# Patient Record
Sex: FEMALE | Race: WHITE | NOT HISPANIC OR LATINO | Employment: OTHER | URBAN - METROPOLITAN AREA
[De-identification: names, ages, dates, MRNs, and addresses within clinical notes are randomized per-mention and may not be internally consistent; named-entity substitution may affect disease eponyms.]

---

## 2018-08-20 DIAGNOSIS — M47.896 OTHER SPONDYLOSIS, LUMBAR REGION: ICD-10-CM

## 2018-08-20 DIAGNOSIS — M47.22 CERVICAL SPONDYLOSIS WITH RADICULOPATHY: Primary | ICD-10-CM

## 2018-09-25 ENCOUNTER — OFFICE VISIT (OUTPATIENT)
Dept: URGENT CARE | Facility: CLINIC | Age: 58
End: 2018-09-25
Payer: COMMERCIAL

## 2018-09-25 VITALS
HEIGHT: 61 IN | WEIGHT: 148 LBS | HEART RATE: 108 BPM | TEMPERATURE: 97 F | SYSTOLIC BLOOD PRESSURE: 140 MMHG | OXYGEN SATURATION: 98 % | BODY MASS INDEX: 27.94 KG/M2 | DIASTOLIC BLOOD PRESSURE: 82 MMHG

## 2018-09-25 DIAGNOSIS — F13.939 WITHDRAWAL FROM SEDATIVE, HYPNOTIC, OR ANXIOLYTIC DRUG: ICD-10-CM

## 2018-09-25 DIAGNOSIS — R00.0 TACHYCARDIA: ICD-10-CM

## 2018-09-25 DIAGNOSIS — F41.1 ANXIETY REACTION: Primary | ICD-10-CM

## 2018-09-25 PROCEDURE — 99203 OFFICE O/P NEW LOW 30 MIN: CPT | Mod: S$GLB,,, | Performed by: NURSE PRACTITIONER

## 2018-09-25 PROCEDURE — 3008F BODY MASS INDEX DOCD: CPT | Mod: CPTII,S$GLB,, | Performed by: NURSE PRACTITIONER

## 2018-09-25 RX ORDER — HYDROXYZINE HYDROCHLORIDE 25 MG/1
TABLET, FILM COATED ORAL
Refills: 0 | COMMUNITY
Start: 2018-09-18

## 2018-09-25 RX ORDER — HYDROCODONE BITARTRATE AND ACETAMINOPHEN 10; 325 MG/1; MG/1
TABLET ORAL
Refills: 0 | COMMUNITY
Start: 2018-08-29

## 2018-09-25 RX ORDER — BACLOFEN 10 MG/1
TABLET ORAL
Refills: 0 | COMMUNITY
Start: 2018-09-12

## 2018-09-25 RX ORDER — METFORMIN HYDROCHLORIDE 500 MG/1
TABLET, EXTENDED RELEASE ORAL
Refills: 4 | COMMUNITY
Start: 2018-09-04

## 2018-09-25 NOTE — PROGRESS NOTES
"Subjective:       Patient ID: Andreina Brannon is a 58 y.o. female.    Vitals:  height is 5' 1" (1.549 m) and weight is 67.1 kg (148 lb). Her temperature is 97.2 °F (36.2 °C). Her blood pressure is 140/82 (abnormal) and her pulse is 108. Her oxygen saturation is 98%.     Chief Complaint: Chest Pain    Patient originally checked in with c/o elevated heart rate.  She is not having chest pain.  Patient reports "they're screwing with my medicines."  She is under the care of her PCP through [facility] and [facility].  She was stopped on Xanax, Oxycontin, and Soma over the last month and states that she feels like her anxiety is worsening with insomnia and feeling her heart race.  She went to her PCP 1 week ago for this same complaint and was started on Vistaril four times a day.  She states that she's getting no relief.  She also reports that she is still taking her Cymbalta with no recent changes.  She states that no one in the office will write her for anxiety medication.  She was told to follow up with psychiatry but she has not found a psychiatrist.  She actually can not feel her heart racing states that she just feels "anxious."  Reports that she knows of her elevated heart rate s/t her PCP noting it to be elevated during exam last week.  She does not have a history of HTN, CAD, MI, Stroke.  No history of early MI in her family.  She denies associated shortness of breath, leg swelling, diaphoresis, and again denies chest pain.      Anxiety   Presents for initial visit. The problem has been gradually worsening. Symptoms include dry mouth, excessive worry, insomnia, irritability, nervous/anxious behavior, palpitations and restlessness. Patient reports no chest pain, compulsions, confusion, decreased concentration, depressed mood, dizziness, feeling of choking, hyperventilation, impotence, malaise, muscle tension, nausea, obsessions, panic, shortness of breath or suicidal ideas.     Her past medical history is " significant for anxiety/panic attacks. There is no history of arrhythmia, bipolar disorder, CAD, CHF, hyperthyroidism or suicide attempts. Past treatments include benzodiazephines and SSRIs.     Review of Systems   Constitution: Positive for irritability. Negative for chills, fever and malaise/fatigue.   HENT: Negative for hoarse voice.    Eyes: Negative for blurred vision.   Cardiovascular: Positive for palpitations. Negative for chest pain, irregular heartbeat, leg swelling, near-syncope and syncope.   Respiratory: Negative for cough and shortness of breath.    Skin: Negative for rash.   Musculoskeletal: Negative for back pain.   Gastrointestinal: Negative for abdominal pain, nausea and vomiting.   Genitourinary: Negative for impotence.   Neurological: Negative for dizziness, headaches and light-headedness.   Psychiatric/Behavioral: Negative for confusion, decreased concentration and suicidal ideas. The patient has insomnia and is nervous/anxious.        Objective:      Physical Exam   Constitutional: She is oriented to person, place, and time. She appears well-developed and well-nourished. She is cooperative.  Non-toxic appearance. She does not appear ill. No distress.   HENT:   Head: Normocephalic and atraumatic.   Right Ear: Hearing, tympanic membrane, external ear and ear canal normal.   Left Ear: Hearing, tympanic membrane, external ear and ear canal normal.   Nose: Nose normal. No mucosal edema, rhinorrhea or nasal deformity. No epistaxis. Right sinus exhibits no maxillary sinus tenderness and no frontal sinus tenderness. Left sinus exhibits no maxillary sinus tenderness and no frontal sinus tenderness.   Mouth/Throat: Uvula is midline, oropharynx is clear and moist and mucous membranes are normal. No trismus in the jaw. Normal dentition. No uvula swelling. No posterior oropharyngeal erythema.   Eyes: Conjunctivae and lids are normal. Right eye exhibits no discharge. Left eye exhibits no discharge. No  scleral icterus.   Sclera clear bilat   Neck: Trachea normal, normal range of motion, full passive range of motion without pain and phonation normal. Neck supple.   Cardiovascular: Normal rate, regular rhythm, normal heart sounds, intact distal pulses and normal pulses.   Pulmonary/Chest: Effort normal and breath sounds normal. No respiratory distress.   Abdominal: Soft. Normal appearance and bowel sounds are normal. She exhibits no distension, no pulsatile midline mass and no mass. There is no tenderness.   Musculoskeletal: Normal range of motion. She exhibits no edema or deformity.   Neurological: She is alert and oriented to person, place, and time. She exhibits normal muscle tone. Coordination normal.   Skin: Skin is warm, dry and intact. She is not diaphoretic. No pallor.   Psychiatric: She has a normal mood and affect. Her speech is normal and behavior is normal. Judgment and thought content normal. Cognition and memory are normal.   Dry mouth, slight tremor to hands, rapid speech, normal thought process, denies SI.  Does appear anxious.   Nursing note and vitals reviewed.      EKG: normal EKG, normal sinus rhythm with sinus tachycardia, HR of 108, no acute ST findings, no EKG for comparison.  Assessment:       1. Anxiety reaction    2. Withdrawal from sedative, hypnotic, or anxiolytic drug    3. Tachycardia        Plan:         Anxiety reaction  -     Ambulatory referral to Psychiatry    Withdrawal from sedative, hypnotic, or anxiolytic drug  -     Ambulatory referral to Psychiatry    Tachycardia  -     Ambulatory referral to Psychiatry  -     IN OFFICE EKG 12-LEAD (to [facility])      Patient Instructions   A referral was placed for psychiatry for further management of your anxiety medications.  Call your PCP to schedule follow up as well.  Go to the ER for any worsening symptoms including chest pain, shortness of breath, feeling like you're going to pass out, worsening anxiety, seizures, or thoughts of wanting to  harm yourself or others.  Anxiety Reaction  Anxiety is the feeling we all get when we think something bad might happen. It is a normal response to stress and usually causes only a mild reaction. When anxiety becomes more severe, it can interfere with daily life. In some cases, you may not even be aware of what it is youre anxious about. There may also be a genetic link or it may be a learned behavior in the home.  Both psychological and physical triggers cause stress reaction. It's often a response to fear or emotional stress, real or imagined. This stress may come from home, family, work, or social relationships.  During an anxiety reaction, you may feel:  · Helpless  · Nervous  · Depressed  · Irritable  Your body may show signs of anxiety in many ways. You may experience:  · Dry mouth  · Shakiness  · Dizziness  · Weakness  · Trouble breathing  · Breathing fast (hyperventilating)  · Chest pressure  · Sweating  · Headache  · Nausea  · Diarrhea  · Tiredness  · Inability to sleep  · Sexual problems  Home care  · Try to locate the sources of stress in your life. They may not be obvious. These may include:  ¨ Daily hassles of life (traffic jams, missed appointments, car troubles, etc.)  ¨ Major life changes, both good (new baby, job promotion) and bad (loss of job, loss of loved one)  ¨ Overload: feeling that you have too many responsibilities and can't take care of all of them at once  ¨ Feeling helpless, feeling that your problems are beyond what youre able to solve  · Notice how your body reacts to stress. Learn to listen to your body signals. This will help you take action before the stress becomes severe.  · When you can, do something about the source of your stress. (Avoid hassles, limit the amount of change that happens in your life at one time and take a break when you feel overloaded).  · Unfortunately, many stressful situations can't be avoided. It is necessary to learn how to better manage stress. There are  many proven methods that will reduce your anxiety. These include simple things like exercise, good nutrition and adequate rest. Also, there are certain techniques that are helpful:  ¨ Relaxation  ¨ Breathing exercises  ¨ Visualization  ¨ Biofeedback  ¨ Meditation  For more information about this, consult your doctor or go to a local bookstore and review the many books and tapes available on this subject.  Follow-up care  If you feel that your anxiety is not responding to self-help measures, contact your doctor or make an appointment with a counselor. You may need short-term psychological counseling and temporary medicine to help you manage stress.  Call 911  Call your healthcare provider right away if any of these occur:  · Trouble breathing  · Confusion  · Drowsiness or trouble wakening  · Fainting or loss of consciousness  · Rapid heart rate  · Seizure  · New chest pain that becomes more severe, lasts longer, or spreads into your shoulder, arm, neck, jaw, or back  When to seek medical advice  Call your healthcare provider right away if any of these occur:  · Your symptoms get worse  · Severe headache not relieved by rest and mild pain reliever  Date Last Reviewed: 9/29/2015  © 9408-3597 [employer] [address] [place] [address], [place] [address]. All rights reserved. This information is not intended as a substitute for professional medical care. Always follow your healthcare professional's instructions.

## 2018-09-25 NOTE — PATIENT INSTRUCTIONS
A referral was placed for psychiatry for further management of your anxiety medications.  Call your PCP to schedule follow up as well.  Go to the ER for any worsening symptoms including chest pain, shortness of breath, feeling like you're going to pass out, worsening anxiety, seizures, or thoughts of wanting to harm yourself or others.  Anxiety Reaction  Anxiety is the feeling we all get when we think something bad might happen. It is a normal response to stress and usually causes only a mild reaction. When anxiety becomes more severe, it can interfere with daily life. In some cases, you may not even be aware of what it is youre anxious about. There may also be a genetic link or it may be a learned behavior in the home.  Both psychological and physical triggers cause stress reaction. It's often a response to fear or emotional stress, real or imagined. This stress may come from home, family, work, or social relationships.  During an anxiety reaction, you may feel:  · Helpless  · Nervous  · Depressed  · Irritable  Your body may show signs of anxiety in many ways. You may experience:  · Dry mouth  · Shakiness  · Dizziness  · Weakness  · Trouble breathing  · Breathing fast (hyperventilating)  · Chest pressure  · Sweating  · Headache  · Nausea  · Diarrhea  · Tiredness  · Inability to sleep  · Sexual problems  Home care  · Try to locate the sources of stress in your life. They may not be obvious. These may include:  ¨ Daily hassles of life (traffic jams, missed appointments, car troubles, etc.)  ¨ Major life changes, both good (new baby, job promotion) and bad (loss of job, loss of loved one)  ¨ Overload: feeling that you have too many responsibilities and can't take care of all of them at once  ¨ Feeling helpless, feeling that your problems are beyond what youre able to solve  · Notice how your body reacts to stress. Learn to listen to your body signals. This will help you take action before the stress becomes  severe.  · When you can, do something about the source of your stress. (Avoid hassles, limit the amount of change that happens in your life at one time and take a break when you feel overloaded).  · Unfortunately, many stressful situations can't be avoided. It is necessary to learn how to better manage stress. There are many proven methods that will reduce your anxiety. These include simple things like exercise, good nutrition and adequate rest. Also, there are certain techniques that are helpful:  ¨ Relaxation  ¨ Breathing exercises  ¨ Visualization  ¨ Biofeedback  ¨ Meditation  For more information about this, consult your doctor or go to a local bookstore and review the many books and tapes available on this subject.  Follow-up care  If you feel that your anxiety is not responding to self-help measures, contact your doctor or make an appointment with a counselor. You may need short-term psychological counseling and temporary medicine to help you manage stress.  Call 911  Call your healthcare provider right away if any of these occur:  · Trouble breathing  · Confusion  · Drowsiness or trouble wakening  · Fainting or loss of consciousness  · Rapid heart rate  · Seizure  · New chest pain that becomes more severe, lasts longer, or spreads into your shoulder, arm, neck, jaw, or back  When to seek medical advice  Call your healthcare provider right away if any of these occur:  · Your symptoms get worse  · Severe headache not relieved by rest and mild pain reliever  Date Last Reviewed: 9/29/2015  © 2794-5825 [employer]. [address] [place] [address], [place] [address]. All rights reserved. This information is not intended as a substitute for professional medical care. Always follow your healthcare professional's instructions.

## 2018-11-25 ENCOUNTER — HOSPITAL ENCOUNTER (EMERGENCY)
Facility: HOSPITAL | Age: 58
Discharge: HOME | End: 2018-11-25
Attending: INTERNAL MEDICINE
Payer: COMMERCIAL

## 2018-11-25 VITALS
TEMPERATURE: 98 F | SYSTOLIC BLOOD PRESSURE: 168 MMHG | BODY MASS INDEX: 29.45 KG/M2 | WEIGHT: 156 LBS | OXYGEN SATURATION: 96 % | RESPIRATION RATE: 18 BRPM | DIASTOLIC BLOOD PRESSURE: 87 MMHG | HEART RATE: 90 BPM | HEIGHT: 61 IN

## 2018-11-25 DIAGNOSIS — M79.602 LEFT ARM PAIN: Primary | ICD-10-CM

## 2018-11-25 PROCEDURE — 63600175 PHARM REV CODE 636 W HCPCS: Performed by: INTERNAL MEDICINE

## 2018-11-25 PROCEDURE — 96372 THER/PROPH/DIAG INJ SC/IM: CPT

## 2018-11-25 PROCEDURE — 99284 EMERGENCY DEPT VISIT MOD MDM: CPT | Mod: 25

## 2018-11-25 RX ORDER — KETOROLAC TROMETHAMINE 30 MG/ML
60 INJECTION, SOLUTION INTRAMUSCULAR; INTRAVENOUS
Status: COMPLETED | OUTPATIENT
Start: 2018-11-25 | End: 2018-11-25

## 2018-11-25 RX ADMIN — KETOROLAC TROMETHAMINE 60 MG: 30 INJECTION, SOLUTION INTRAMUSCULAR at 01:11

## 2018-11-25 NOTE — ED NOTES
Pt arrived to ED with c/o pain that starts at left elbow and radiates down to fingers. Pt is currently being treated for this pain for the past month. Pt reports pain has increased and is worse tonight. Resp even and non labored. NAD noted.

## 2018-11-25 NOTE — ED PROVIDER NOTES
Encounter Date: 11/25/2018       History     Chief Complaint   Patient presents with    left arm pain     pt c/o left arm pain starting at elbow down to fingers, hx of chronic back pain and nerve pain x3 months. pain worsened tonight, reports norco 10 not helping, denies new injury or trauma, pulse present, nad     58-year-old female presents to the emergency department complaining of left arm and hand pain times several weeks.  She states she has pinched nerve in her neck and in her lower back for which she sees pain clinic and received injections.  She called her pain clinic doctors office tonight and they advised her to come to the emergency department.  Patient denies fever/chills, nausea/vomiting.  She denies any focal neurologic symptoms as well.      The history is provided by the patient. No  was used.     Review of patient's allergies indicates:   Allergen Reactions    Morphine     Reglan [metoclopramide hcl]     Sulfa (sulfonamide antibiotics)     Iodine and iodide containing products Rash     Past Medical History:   Diagnosis Date    Anxiety     Chronic back pain     Hypertension      Past Surgical History:   Procedure Laterality Date    HYSTERECTOMY       Family History   Problem Relation Age of Onset    No Known Problems Mother     No Known Problems Father      Social History     Tobacco Use    Smoking status: Never Smoker    Smokeless tobacco: Never Used   Substance Use Topics    Alcohol use: No     Frequency: Never    Drug use: No     Review of Systems   Musculoskeletal:        Left arm pain   All other systems reviewed and are negative.      Physical Exam     Initial Vitals [11/25/18 0052]   BP Pulse Resp Temp SpO2   (!) 176/81 90 18 98.4 °F (36.9 °C) 98 %      MAP       --         Physical Exam    Nursing note and vitals reviewed.  Constitutional: She appears well-developed and well-nourished. No distress.   HENT:   Head: Normocephalic and atraumatic.   Right Ear:  External ear normal.   Left Ear: External ear normal.   Eyes: Conjunctivae and EOM are normal.   Neck: Normal range of motion. Neck supple.   Cardiovascular: Normal rate and regular rhythm.   Pulmonary/Chest: Breath sounds normal. No respiratory distress.   Abdominal: Soft. Bowel sounds are normal.   Musculoskeletal:   Right arm pain upon movement without gross deformity.  Neurovascularly intact.   Neurological: She is alert and oriented to person, place, and time. She has normal strength and normal reflexes. No cranial nerve deficit or sensory deficit.   Skin: Skin is warm and dry.   Psychiatric: She has a normal mood and affect.         ED Course   Procedures  Labs Reviewed - No data to display       Imaging Results    None          Medical Decision Making:   Initial Assessment:   58-year-old female presents to the emergency department complaining of left arm and hand pain times several weeks.  She states she has pinched nerve in her neck and in her lower back for which she sees pain clinic and received injections.  She called her pain clinic doctors office tonight and they advised her to come to the emergency department.  Patient denies fever/chills, nausea/vomiting.  She denies any focal neurologic symptoms as well.    ED Management:  Patient was counseled on chronic pain and the need for her chronic pain to be treated in her Pain Clinic instead the emergency department.  She was not found to have any emergent cause for her left upper extremity pain tonight and was given explanation as to why her a narcotic pain medications could not be refilled in the emergency department.  Patient was given a dose of Toradol in the emergency department and advised to follow up with her pain clinic doctor within the next 2 days for re-evaluation and refills for pain medications.                      Clinical Impression:   The encounter diagnosis was Left arm pain.      Disposition:   Disposition: Discharged  Condition:  Stable                        Regan Dickson MD  11/25/18 0106       Regan Dickson MD  11/25/18 0106

## 2019-06-28 ENCOUNTER — HOSPITAL ENCOUNTER (EMERGENCY)
Facility: HOSPITAL | Age: 59
Discharge: HOME | End: 2019-06-28
Attending: EMERGENCY MEDICINE
Payer: COMMERCIAL

## 2019-06-28 VITALS
DIASTOLIC BLOOD PRESSURE: 76 MMHG | TEMPERATURE: 98 F | SYSTOLIC BLOOD PRESSURE: 131 MMHG | RESPIRATION RATE: 16 BRPM | BODY MASS INDEX: 27 KG/M2 | HEIGHT: 61 IN | HEART RATE: 86 BPM | OXYGEN SATURATION: 97 % | WEIGHT: 143 LBS

## 2019-06-28 DIAGNOSIS — R10.13 EPIGASTRIC PAIN: Primary | ICD-10-CM

## 2019-06-28 LAB
ALBUMIN SERPL BCP-MCNC: 4.1 G/DL (ref 3.5–5.2)
ALP SERPL-CCNC: 60 U/L (ref 55–135)
ALT SERPL W/O P-5'-P-CCNC: 9 U/L (ref 10–44)
ANION GAP SERPL CALC-SCNC: 8 MMOL/L (ref 8–16)
AST SERPL-CCNC: 22 U/L (ref 10–40)
BASOPHILS # BLD AUTO: 0.02 K/UL (ref 0–0.2)
BASOPHILS NFR BLD: 0.5 % (ref 0–1.9)
BILIRUB SERPL-MCNC: 0.9 MG/DL (ref 0.1–1)
BNP SERPL-MCNC: 26 PG/ML (ref 0–99)
BUN SERPL-MCNC: 12 MG/DL (ref 6–20)
CALCIUM SERPL-MCNC: 10 MG/DL (ref 8.7–10.5)
CHLORIDE SERPL-SCNC: 107 MMOL/L (ref 95–110)
CO2 SERPL-SCNC: 27 MMOL/L (ref 23–29)
CREAT SERPL-MCNC: 0.7 MG/DL (ref 0.5–1.4)
DIFFERENTIAL METHOD: ABNORMAL
EOSINOPHIL # BLD AUTO: 0 K/UL (ref 0–0.5)
EOSINOPHIL NFR BLD: 1 % (ref 0–8)
ERYTHROCYTE [DISTWIDTH] IN BLOOD BY AUTOMATED COUNT: 13.4 % (ref 11.5–14.5)
EST. GFR  (AFRICAN AMERICAN): >60 ML/MIN/1.73 M^2
EST. GFR  (NON AFRICAN AMERICAN): >60 ML/MIN/1.73 M^2
GLUCOSE SERPL-MCNC: 134 MG/DL (ref 70–110)
HCT VFR BLD AUTO: 37.8 % (ref 37–48.5)
HGB BLD-MCNC: 11.9 G/DL (ref 12–16)
LIPASE SERPL-CCNC: 13 U/L (ref 4–60)
LYMPHOCYTES # BLD AUTO: 1.5 K/UL (ref 1–4.8)
LYMPHOCYTES NFR BLD: 36.9 % (ref 18–48)
MCH RBC QN AUTO: 27 PG (ref 27–31)
MCHC RBC AUTO-ENTMCNC: 31.5 G/DL (ref 32–36)
MCV RBC AUTO: 86 FL (ref 82–98)
MONOCYTES # BLD AUTO: 0.2 K/UL (ref 0.3–1)
MONOCYTES NFR BLD: 6.1 % (ref 4–15)
NEUTROPHILS # BLD AUTO: 2.2 K/UL (ref 1.8–7.7)
NEUTROPHILS NFR BLD: 55.5 % (ref 38–73)
PLATELET # BLD AUTO: 382 K/UL (ref 150–350)
PMV BLD AUTO: 9.1 FL (ref 9.2–12.9)
POTASSIUM SERPL-SCNC: 3.4 MMOL/L (ref 3.5–5.1)
PROT SERPL-MCNC: 7.4 G/DL (ref 6–8.4)
RBC # BLD AUTO: 4.4 M/UL (ref 4–5.4)
SODIUM SERPL-SCNC: 142 MMOL/L (ref 136–145)
TROPONIN I SERPL DL<=0.01 NG/ML-MCNC: <0.006 NG/ML (ref 0–0.03)
WBC # BLD AUTO: 3.93 K/UL (ref 3.9–12.7)

## 2019-06-28 PROCEDURE — 85025 COMPLETE CBC W/AUTO DIFF WBC: CPT

## 2019-06-28 PROCEDURE — 93005 ELECTROCARDIOGRAM TRACING: CPT

## 2019-06-28 PROCEDURE — 99285 EMERGENCY DEPT VISIT HI MDM: CPT | Mod: 25

## 2019-06-28 PROCEDURE — S0028 INJECTION, FAMOTIDINE, 20 MG: HCPCS | Performed by: EMERGENCY MEDICINE

## 2019-06-28 PROCEDURE — 83690 ASSAY OF LIPASE: CPT

## 2019-06-28 PROCEDURE — 25000003 PHARM REV CODE 250: Performed by: EMERGENCY MEDICINE

## 2019-06-28 PROCEDURE — 80053 COMPREHEN METABOLIC PANEL: CPT

## 2019-06-28 PROCEDURE — 93010 EKG 12-LEAD: ICD-10-PCS | Mod: ,,, | Performed by: INTERNAL MEDICINE

## 2019-06-28 PROCEDURE — 93010 ELECTROCARDIOGRAM REPORT: CPT | Mod: ,,, | Performed by: INTERNAL MEDICINE

## 2019-06-28 PROCEDURE — 84484 ASSAY OF TROPONIN QUANT: CPT

## 2019-06-28 PROCEDURE — 83880 ASSAY OF NATRIURETIC PEPTIDE: CPT

## 2019-06-28 PROCEDURE — 96374 THER/PROPH/DIAG INJ IV PUSH: CPT

## 2019-06-28 RX ORDER — FAMOTIDINE 10 MG/ML
40 INJECTION INTRAVENOUS
Status: COMPLETED | OUTPATIENT
Start: 2019-06-28 | End: 2019-06-28

## 2019-06-28 RX ORDER — SIMETHICONE 125 MG
125 CAPSULE ORAL 4 TIMES DAILY PRN
Qty: 30 CAPSULE | Refills: 0 | Status: SHIPPED | OUTPATIENT
Start: 2019-06-28

## 2019-06-28 RX ORDER — ONDANSETRON 4 MG/1
4 TABLET, FILM COATED ORAL EVERY 6 HOURS
Qty: 12 TABLET | Refills: 0 | OUTPATIENT
Start: 2019-06-28 | End: 2020-02-15

## 2019-06-28 RX ORDER — DICYCLOMINE HYDROCHLORIDE 20 MG/1
20 TABLET ORAL 2 TIMES DAILY
Qty: 20 TABLET | Refills: 0 | Status: SHIPPED | OUTPATIENT
Start: 2019-06-28 | End: 2019-07-28

## 2019-06-28 RX ORDER — SUCRALFATE 1 G/1
1 TABLET ORAL 4 TIMES DAILY
Qty: 60 TABLET | Refills: 0 | Status: SHIPPED | OUTPATIENT
Start: 2019-06-28

## 2019-06-28 RX ADMIN — LIDOCAINE HYDROCHLORIDE: 20 SOLUTION ORAL; TOPICAL at 01:06

## 2019-06-28 RX ADMIN — FAMOTIDINE 40 MG: 10 INJECTION INTRAVENOUS at 01:06

## 2019-06-28 NOTE — ED TRIAGE NOTES
Pt reports to the ED with c/o chest pain that began around 1050AM this morning. States the pain radiates to the left side of her back and is located to the midsternal area of the chest. Also c/o SOB, dizziness, and nausea. VSS. In NAD at this time.

## 2019-06-28 NOTE — DISCHARGE INSTRUCTIONS
Thank you for coming to our Emergency Department today. It is important to remember that some problems are difficult to diagnose and may not be found during your first visit. Be sure to follow up with your primary care doctor and review any labs/imaging that was performed with them. If you do not have a primary care doctor, you may contact the one listed on your discharge paperwork or you may also call the [facility] Appointment Desk at [phone] to schedule an appointment with one.     All medications may potentially have side effects and it is impossible to predict which medications may give you side effects. If you feel that you are having a negative effect of any medication you should immediately stop taking them and seek medical attention.    Return to the ER with any questions/concerns, new/concerning symptoms, worsening or failure to improve. Do not drive or make any important decisions for 24 hours if you have received any pain medications, sedatives or mood altering drugs during your ER visit.

## 2019-06-28 NOTE — ED PROVIDER NOTES
"Encounter Date: 6/28/2019  This is a SORT/MSE of a 59 y.o. female with HTN presenting to the ED with c/o mid-sternal CP since 10:30 am. Care will be transferred to an alternate provider when patient is roomed for a full evaluation and final disposition. Patient is aware that he/she is awaiting a room in the emergency department, where another provider will review results, evaluate and treat as needed. MATTHIAS Plummer DNP     History     Chief Complaint   Patient presents with    Chest Pain     reports having CP that started around 1050 this morning.  staes CP is to left mid back are,a nd mid chest area. staes having SOB,  dizziness, and nausea     59 y.o.  female Past Medical History:  No date: Anxiety  No date: Chronic back pain  No date: Hypertension     Presents c/o sharp epigastric pain radiating to back starting approx 10:50 this am. Notes associated nausea with it, pain came on while eating milk/cookies. Denies chest pressure. Did note that the pain got "so bad that she thought she was going to pass out and felt dizzy". Notes that she feels that it is slightly harder to breath. Denies etoh, has never had gallstones.        Review of patient's allergies indicates:   Allergen Reactions    Morphine     Reglan [metoclopramide hcl]     Sulfa (sulfonamide antibiotics)     Iodine and iodide containing products Rash     Past Medical History:   Diagnosis Date    Anxiety     Chronic back pain     Hypertension      Past Surgical History:   Procedure Laterality Date    HYSTERECTOMY       Family History   Problem Relation Age of Onset    No Known Problems Mother     No Known Problems Father      Social History     Tobacco Use    Smoking status: Never Smoker    Smokeless tobacco: Never Used   Substance Use Topics    Alcohol use: No     Frequency: Never    Drug use: No     Review of Systems   Constitutional: Negative for fever.   HENT: Negative for sore throat.    Respiratory: Negative for shortness of breath.  "   Cardiovascular: Negative for chest pain.   Gastrointestinal: Positive for abdominal pain. Negative for nausea.   Genitourinary: Negative for dysuria.   Musculoskeletal: Negative for back pain.   Skin: Negative for rash.   Neurological: Negative for weakness.   Hematological: Does not bruise/bleed easily.   All other systems reviewed and are negative.      Physical Exam     Initial Vitals [06/28/19 1225]   BP Pulse Resp Temp SpO2   132/68 97 18 98 °F (36.7 °C) 97 %      MAP       --         Physical Exam    Nursing note and vitals reviewed.  Constitutional: She appears well-developed and well-nourished.   HENT:   Head: Normocephalic and atraumatic.   Eyes: Conjunctivae and EOM are normal. Pupils are equal, round, and reactive to light.   Neck: Normal range of motion.   Cardiovascular: Normal rate, regular rhythm and normal heart sounds.   Pulmonary/Chest: Breath sounds normal. No respiratory distress. She has no wheezes. She has no rales.   Abdominal: She exhibits no distension.   Musculoskeletal: Normal range of motion.   Neurological: She is alert. No cranial nerve deficit. GCS score is 15. GCS eye subscore is 4. GCS verbal subscore is 5. GCS motor subscore is 6.   Skin: Skin is warm and dry.   Psychiatric: She has a normal mood and affect. Thought content normal.     completely reproducible epigastric ttp, pushing on this area makes patient wince.    ED Course   Procedures  Labs Reviewed   CBC W/ AUTO DIFFERENTIAL   COMPREHENSIVE METABOLIC PANEL   TROPONIN I   B-TYPE NATRIURETIC PEPTIDE     EKG Readings: (Independently Interpreted)   Hr 98, nl axis/intervals, no nando, twi III, AVF, no stemi.       Imaging Results    None             Additional MDM:   Heart Score:    History:          Slightly suspicious.  ECG:             Nonspecific repolarisation disturbance  Age:               45-65 years  Risk factors: 1-2 risk factors  Troponin:       Less than or equal to normal limit  Final Score: 3                  epigastric pain is non cardiac in nature, reproducible with palpation. Will start pt on carafate, simethicone, bentyl, zofran and have her f/u with pmd.     Clinical Impression:       ICD-10-CM ICD-9-CM   1. Epigastric pain R10.13 789.06                                Varinder Meza MD  06/28/19 1345

## 2020-02-14 ENCOUNTER — PATIENT MESSAGE (OUTPATIENT)
Dept: URGENT CARE | Facility: CLINIC | Age: 60
End: 2020-02-14

## 2020-02-14 ENCOUNTER — OFFICE VISIT (OUTPATIENT)
Dept: URGENT CARE | Facility: CLINIC | Age: 60
End: 2020-02-14
Payer: COMMERCIAL

## 2020-02-14 VITALS
HEART RATE: 64 BPM | OXYGEN SATURATION: 98 % | DIASTOLIC BLOOD PRESSURE: 78 MMHG | HEIGHT: 61 IN | TEMPERATURE: 98 F | WEIGHT: 143 LBS | SYSTOLIC BLOOD PRESSURE: 130 MMHG | BODY MASS INDEX: 27 KG/M2

## 2020-02-14 DIAGNOSIS — R11.0 NAUSEA: ICD-10-CM

## 2020-02-14 DIAGNOSIS — H65.93 MIDDLE EAR EFFUSION, BILATERAL: ICD-10-CM

## 2020-02-14 DIAGNOSIS — H81.10 BENIGN PAROXYSMAL POSITIONAL VERTIGO, UNSPECIFIED LATERALITY: Primary | ICD-10-CM

## 2020-02-14 PROCEDURE — 99214 PR OFFICE/OUTPT VISIT, EST, LEVL IV, 30-39 MIN: ICD-10-PCS | Mod: S$GLB,,, | Performed by: PHYSICIAN ASSISTANT

## 2020-02-14 PROCEDURE — 99214 OFFICE O/P EST MOD 30 MIN: CPT | Mod: S$GLB,,, | Performed by: PHYSICIAN ASSISTANT

## 2020-02-14 RX ORDER — ONDANSETRON 4 MG/1
4 TABLET, ORALLY DISINTEGRATING ORAL EVERY 8 HOURS PRN
Qty: 12 TABLET | Refills: 0 | Status: SHIPPED | OUTPATIENT
Start: 2020-02-14

## 2020-02-14 RX ORDER — METHYLPREDNISOLONE 4 MG/1
TABLET ORAL
Qty: 1 PACKAGE | Refills: 0 | Status: SHIPPED | OUTPATIENT
Start: 2020-02-14

## 2020-02-14 RX ORDER — ONDANSETRON 4 MG/1
4 TABLET, ORALLY DISINTEGRATING ORAL
Status: COMPLETED | OUTPATIENT
Start: 2020-02-14 | End: 2020-02-14

## 2020-02-14 RX ADMIN — ONDANSETRON 4 MG: 4 TABLET, ORALLY DISINTEGRATING ORAL at 06:02

## 2020-02-14 NOTE — PROGRESS NOTES
"Subjective:       Patient ID: Andreina Brannon is a 59 y.o. female.    Vitals:  height is 5' 1" (1.549 m) and weight is 64.9 kg (143 lb). Her temperature is 98.1 °F (36.7 °C). Her blood pressure is 130/78 and her pulse is 64. Her oxygen saturation is 98%.     Chief Complaint: Dizziness      Pt c/o bilateral ear fullness/pressure, dizziness, and nausea on and off for the last 5-6 days. Reports that dizziness is worse with movement and head position changes. Lying still resolves. Reports 4 days ago she saw her PCP for this issue who diagnosed her with fluid in her ears and vertigo, which she states was secondary to the fluid in the ears. Reports she was given a steroid injection  in clinic and a prescription for Antivert (which pt reports she has been taking) and for 2 days had complete resolution of all symptoms. States since then symptoms have returned to previous state. Reports one episode of vomiting secondary to dizziness today when she went from sitting to standing too quickly. Denies falls, confusion, numbness/tingling to extremities, weakness of extremities, neck pain/stiffness, changes in vision, or other neurologic changes. States she wanted to return to PCP today but they were closing.     Dizziness:   Chronicity:  Recurrent  Onset:  In the past 7 days  Progression since onset:  Rapidly improving  Frequency:  Constantly  Dizziness characteristics:  Off-balance and motion-sickness (vomiting, room spins)   Associated symptoms: vomiting.no fever, no headaches, no nausea, no weakness, no light-headedness and no chest pain.  Treatments tried:  Meclizine      Constitution: Negative for chills, fatigue and fever.   HENT: Negative for congestion and sore throat.    Neck: Negative for painful lymph nodes.   Cardiovascular: Negative for chest pain and leg swelling.   Eyes: Negative for double vision and blurred vision.   Respiratory: Negative for cough and shortness of breath.    Gastrointestinal: Positive for " vomiting. Negative for nausea and diarrhea.   Genitourinary: Negative for dysuria, frequency, urgency and history of kidney stones.   Musculoskeletal: Negative for joint pain, joint swelling, muscle cramps and muscle ache.   Skin: Negative for color change, pale, rash and bruising.   Allergic/Immunologic: Negative for seasonal allergies.   Neurological: Positive for dizziness and history of vertigo. Negative for light-headedness, passing out and headaches.   Hematologic/Lymphatic: Negative for swollen lymph nodes.   Psychiatric/Behavioral: Negative for nervous/anxious, sleep disturbance and depression. The patient is not nervous/anxious.        Objective:      Physical Exam   Constitutional: She is oriented to person, place, and time. Vital signs are normal. She appears well-developed and well-nourished. She is cooperative.  Non-toxic appearance. She does not have a sickly appearance. She does not appear ill. No distress.   Patient is laying on exam table in no acute distress. Nontoxic appearing.    HENT:   Head: Normocephalic and atraumatic.   Right Ear: Hearing, external ear and ear canal normal. Tympanic membrane is bulging. A middle ear effusion is present.   Left Ear: Hearing, external ear and ear canal normal. Tympanic membrane is bulging. A middle ear effusion is present.   Nose: Nose normal. No mucosal edema, rhinorrhea or nasal deformity. No epistaxis. Right sinus exhibits no maxillary sinus tenderness and no frontal sinus tenderness. Left sinus exhibits no maxillary sinus tenderness and no frontal sinus tenderness.   Mouth/Throat: Uvula is midline, oropharynx is clear and moist and mucous membranes are normal. No trismus in the jaw. Normal dentition. No uvula swelling. No oropharyngeal exudate, posterior oropharyngeal edema or posterior oropharyngeal erythema.   Positive De Hallpike on exam   Eyes: Pupils are equal, round, and reactive to light. Conjunctivae, EOM and lids are normal. No scleral icterus.    Neck: Trachea normal, normal range of motion, full passive range of motion without pain and phonation normal. Neck supple. No neck rigidity. No edema and no erythema present.   Cardiovascular: Normal rate, regular rhythm, normal heart sounds, intact distal pulses and normal pulses.   Pulmonary/Chest: Effort normal and breath sounds normal. No respiratory distress. She has no decreased breath sounds. She has no rhonchi.   Abdominal: Soft. Normal appearance and bowel sounds are normal. She exhibits no distension. There is no tenderness. There is no rigidity, no rebound, no guarding and no CVA tenderness.   Musculoskeletal: Normal range of motion. She exhibits no edema or deformity.   Neurological: She is alert and oriented to person, place, and time. She has normal strength and normal reflexes. She is not disoriented. No cranial nerve deficit. She exhibits normal muscle tone. She displays a negative Romberg sign. Coordination and gait normal.   Reflex Scores:       Tricep reflexes are 2+ on the right side and 2+ on the left side.       Brachioradialis reflexes are 2+ on the right side and 2+ on the left side.       Patellar reflexes are 2+ on the right side and 2+ on the left side.  Grossly intact. No cranial nerve deficits noted. Rapid hand movements and foot tapping WNL. Negative romberg.    Skin: Skin is warm, dry, intact, not diaphoretic and not pale.   Psychiatric: She has a normal mood and affect. Her speech is normal and behavior is normal. Judgment and thought content normal. Cognition and memory are normal.   Nursing note and vitals reviewed.    Neurologically intact. Dizziness is positional. Educated on Eply maneuver. Advised to continue Antivert. Discussed risks of steroids with patient. Will refer to ENT for further evaluation and management. Advised on return/follow-up precautions. Advised on ER precautions. Answered all patient questions. Patient verbalized understanding and voiced agreement with  current treatment plan.        Assessment:       1. Benign paroxysmal positional vertigo, unspecified laterality    2. Middle ear effusion, bilateral    3. Nausea        Plan:         Benign paroxysmal positional vertigo, unspecified laterality  -     methylPREDNISolone (MEDROL DOSEPACK) 4 mg tablet; use as directed on package until gone  Dispense: 1 Package; Refill: 0  -     ondansetron (ZOFRAN-ODT) 4 MG TbDL; Take 1 tablet (4 mg total) by mouth every 8 (eight) hours as needed (nausea).  Dispense: 12 tablet; Refill: 0    Middle ear effusion, bilateral  -     methylPREDNISolone (MEDROL DOSEPACK) 4 mg tablet; use as directed on package until gone  Dispense: 1 Package; Refill: 0    Nausea  -     ondansetron disintegrating tablet 4 mg      Patient Instructions       Follow up with ENT in the next couple of days as we have discussed.    Continue Meclizine as directed as needed for vertigo.    Do Epley maneuver as I have instructed.    You can take prescribed zofran as directed as needed for nausea/vomiting.    You received a steroid prescription today -  this can elevate your blood pressure, elevate your blood sugar, water weight gain, nervous energy, redness to the face.    If your condition worsens or fails to improve we recommend that you receive another evaluation at the ER immediately or contact your PCP to discuss your concerns or return here. You must understand that you've received an urgent care treatment only and that you may be released before all your medical problems are known or treated. You the patient will arrange for followup care as instructed.             Benign Paroxysmal Positional Vertigo     Your health care provider may move your head in certain ways to treat your BPPV.     Benign paroxysmal positional vertigo (BPPV) is a problem with the inner ear. The inner ear contains the vestibular system. This system is what helps you keep your balance. BPPV causes a feeling of spinning. It is a common  problem of the vestibular system.  Understanding the vestibular system  The vestibular system of the ear is made up of very tiny parts. They include the utricle, saccule, and semicircular canals. The utricle is a tiny organ that contains calcium crystals. In some people, the crystals can move into the semicircular canals. When this happens, the system no longer works as it should. This causes BPPV. Benign means it is not life-threatening. Paroxysmal means it happens suddenly. Positional means that it happens when you move your head. Vertigo is a feeling of spinning.  What causes BPPV?  Causes include injury to your head or neck. Other problems with the vestibular system may cause BPPV. In many people, the cause of BPPV is not known.  Symptoms of BPPV  You many have repeated feelings of spinning (vertigo). The vertigo usually lasts less than 1 minute. Some movements, suchas rolling over in bed, can bring on vertigo.  Diagnosing BPPV  Your primary health care provider may diagnose and treat your BPPV. Or you may see an ear, nose, and throat doctor (otolaryngologist). In some cases, you may see a nervous system doctor (neurologist).  The health care provider will ask about your symptoms and your medical history. He or she will examine you. You may have hearing and balance tests. As part of the exam, your health care provider may have you move your head and body in certain ways. If you have BPPV, the movements can bring on vertigo. Your provider will also look for abnormal movements of your eyes. You may have other tests to check your vestibular or nervous systems.  Treatment for BPPV  Your health care provider may try to move the calcium crystals. This is done by having you move your head and neck in certain ways. This treatment is safe and often works well. You may also be told to do these movements at home. You may still have vertigo for a few weeks. Your health care provider will recheck your symptoms, usually in  about a month. Special physical therapy may also be part of treatment. In rare cases surgery may be needed for BPPV that does not go away.     When to call the health care provider  Call your health care provider right away if you have any of these:  · Symptoms that do not go away with treatment  · Symptoms that get worse  · New symptoms   Date Last Reviewed: 3/19/2015  © 2715-7175 [employer]. [address] [place] [address], [place] [address]. All rights reserved. This information is not intended as a substitute for professional medical care. Always follow your healthcare professional's instructions.        Dizziness (Vertigo) and Balance Problems: Staying Safe     Replace burned-out lightbulbs to keep your home safe and well lit.   Falls or accidents can lead to pain, broken bones, and fear of future falls. Protect yourself and others by preparing for episodes. Simple steps can help you stay safe at home and wherever you go.  Lighting  Keep all areas well lit. This helps your eyes send the right signals to the brain. It also makes you less likely to trip and fall. If bright lights make symptoms worse, dim the lights or lie in a dark room until the dizziness passes. Then turn the lights back to their normal level.  Tips:  · Keep a flashlight by the bed.  · Place nightlights in bathrooms and hallways.  · Replace burned-out bulbs, or have someone replace them for you.  Preventing falls  To reduce your risk of falling:  · Get out of bed or up from a chair slowly.  · Wear low-heeled shoes that fit properly and have slip-resistant soles.  · Remove throw rugs. Clear clutter from walkways.  · Use handrails on stairs. Have handrails installed or adjusted if needed.  · Install grab bars in the bathroom. Don't use towel racks for balance.  · Use a shower stool. Also put adhesive strips in the shower or on the tub floor.  Going out  With a little time and preparation, you can get around safely.  Tips:  · Bring a cane or  walking aid if needed.  · Give yourself plenty of time in case you start to get dizzy.  · Ask your healthcare provider what type of exercise is safe for your condition.  · Be patient. If an activity such as walking through a crowded shop causes you stress, you may not be ready for it yet.  Driving  If you become dizzy or disoriented while driving, you could hurt yourself and others. That's why it's best to not drive until symptoms have gone away. In some cases, your license may be temporarily held until it's safe for you to drive again.  For safety:  · Ask a friend to drive for you.  · Take public transportation.  · Walk to stores and other places when you can.  Asking for help  Don't be afraid to ask for help running errands, cooking meals, and doing exercise. Whether it's a friend, loved one, neighbor, or stranger on the street, a little help can make a world of difference.   Date Last Reviewed: 11/1/2016  © 8397-7871 [employer]. [address] [place] [address], [place] [address]. All rights reserved. This information is not intended as a substitute for professional medical care. Always follow your healthcare professional's instructions.

## 2020-02-15 ENCOUNTER — HOSPITAL ENCOUNTER (EMERGENCY)
Facility: HOSPITAL | Age: 60
Discharge: HOME | End: 2020-02-15
Attending: EMERGENCY MEDICINE
Payer: COMMERCIAL

## 2020-02-15 VITALS
WEIGHT: 143 LBS | RESPIRATION RATE: 18 BRPM | OXYGEN SATURATION: 98 % | SYSTOLIC BLOOD PRESSURE: 118 MMHG | TEMPERATURE: 98 F | HEART RATE: 89 BPM | BODY MASS INDEX: 27 KG/M2 | DIASTOLIC BLOOD PRESSURE: 63 MMHG | HEIGHT: 61 IN

## 2020-02-15 DIAGNOSIS — F07.81 POST CONCUSSIVE SYNDROME: Primary | ICD-10-CM

## 2020-02-15 PROCEDURE — 99284 EMERGENCY DEPT VISIT MOD MDM: CPT | Mod: 25,ER

## 2020-02-15 RX ORDER — BUTALBITAL, ACETAMINOPHEN AND CAFFEINE 50; 325; 40 MG/1; MG/1; MG/1
1 TABLET ORAL EVERY 4 HOURS PRN
Qty: 30 TABLET | Refills: 0 | Status: SHIPPED | OUTPATIENT
Start: 2020-02-15 | End: 2020-03-16

## 2020-02-15 RX ORDER — ONDANSETRON 4 MG/1
4 TABLET, FILM COATED ORAL EVERY 6 HOURS
Qty: 12 TABLET | Refills: 0 | Status: SHIPPED | OUTPATIENT
Start: 2020-02-15

## 2020-02-15 RX ORDER — MECLIZINE HYDROCHLORIDE 25 MG/1
25 TABLET ORAL 3 TIMES DAILY PRN
Qty: 20 TABLET | Refills: 0 | Status: SHIPPED | OUTPATIENT
Start: 2020-02-15

## 2020-02-15 NOTE — ED TRIAGE NOTES
Pt presents to ER with c/o continued dizziness after being treated for vertigo.  She states she hit her head on a cabinet last week, had no LOC or lac.  Next day she had dizziness and went to doctor.  They told her she had fluid in her ears and prescribed meds.  She states she hasn't gotten any better and realized she had forgotten to tell her doctor she had hit her head.  She states she was vomiting yesterday from the dizziness.

## 2020-02-15 NOTE — PATIENT INSTRUCTIONS
Follow up with ENT in the next couple of days as we have discussed.    Continue Meclizine as directed as needed for vertigo.    Do Epley maneuver as I have instructed.    You can take prescribed zofran as directed as needed for nausea/vomiting.    You received a steroid prescription today -  this can elevate your blood pressure, elevate your blood sugar, water weight gain, nervous energy, redness to the face.    If your condition worsens or fails to improve we recommend that you receive another evaluation at the ER immediately or contact your PCP to discuss your concerns or return here. You must understand that you've received an urgent care treatment only and that you may be released before all your medical problems are known or treated. You the patient will arrange for followup care as instructed.             Benign Paroxysmal Positional Vertigo     Your health care provider may move your head in certain ways to treat your BPPV.     Benign paroxysmal positional vertigo (BPPV) is a problem with the inner ear. The inner ear contains the vestibular system. This system is what helps you keep your balance. BPPV causes a feeling of spinning. It is a common problem of the vestibular system.  Understanding the vestibular system  The vestibular system of the ear is made up of very tiny parts. They include the utricle, saccule, and semicircular canals. The utricle is a tiny organ that contains calcium crystals. In some people, the crystals can move into the semicircular canals. When this happens, the system no longer works as it should. This causes BPPV. Benign means it is not life-threatening. Paroxysmal means it happens suddenly. Positional means that it happens when you move your head. Vertigo is a feeling of spinning.  What causes BPPV?  Causes include injury to your head or neck. Other problems with the vestibular system may cause BPPV. In many people, the cause of BPPV is not known.  Symptoms of BPPV  You many have  repeated feelings of spinning (vertigo). The vertigo usually lasts less than 1 minute. Some movements, suchas rolling over in bed, can bring on vertigo.  Diagnosing BPPV  Your primary health care provider may diagnose and treat your BPPV. Or you may see an ear, nose, and throat doctor (otolaryngologist). In some cases, you may see a nervous system doctor (neurologist).  The health care provider will ask about your symptoms and your medical history. He or she will examine you. You may have hearing and balance tests. As part of the exam, your health care provider may have you move your head and body in certain ways. If you have BPPV, the movements can bring on vertigo. Your provider will also look for abnormal movements of your eyes. You may have other tests to check your vestibular or nervous systems.  Treatment for BPPV  Your health care provider may try to move the calcium crystals. This is done by having you move your head and neck in certain ways. This treatment is safe and often works well. You may also be told to do these movements at home. You may still have vertigo for a few weeks. Your health care provider will recheck your symptoms, usually in about a month. Special physical therapy may also be part of treatment. In rare cases surgery may be needed for BPPV that does not go away.     When to call the health care provider  Call your health care provider right away if you have any of these:  · Symptoms that do not go away with treatment  · Symptoms that get worse  · New symptoms   Date Last Reviewed: 3/19/2015  © 4094-8429 [employer]. [address] [place] [address], [place] [address]. All rights reserved. This information is not intended as a substitute for professional medical care. Always follow your healthcare professional's instructions.        Dizziness (Vertigo) and Balance Problems: Staying Safe     Replace burned-out lightbulbs to keep your home safe and well lit.   Falls or accidents can lead  to pain, broken bones, and fear of future falls. Protect yourself and others by preparing for episodes. Simple steps can help you stay safe at home and wherever you go.  Lighting  Keep all areas well lit. This helps your eyes send the right signals to the brain. It also makes you less likely to trip and fall. If bright lights make symptoms worse, dim the lights or lie in a dark room until the dizziness passes. Then turn the lights back to their normal level.  Tips:  · Keep a flashlight by the bed.  · Place nightlights in bathrooms and hallways.  · Replace burned-out bulbs, or have someone replace them for you.  Preventing falls  To reduce your risk of falling:  · Get out of bed or up from a chair slowly.  · Wear low-heeled shoes that fit properly and have slip-resistant soles.  · Remove throw rugs. Clear clutter from walkways.  · Use handrails on stairs. Have handrails installed or adjusted if needed.  · Install grab bars in the bathroom. Don't use towel racks for balance.  · Use a shower stool. Also put adhesive strips in the shower or on the tub floor.  Going out  With a little time and preparation, you can get around safely.  Tips:  · Bring a cane or walking aid if needed.  · Give yourself plenty of time in case you start to get dizzy.  · Ask your healthcare provider what type of exercise is safe for your condition.  · Be patient. If an activity such as walking through a crowded shop causes you stress, you may not be ready for it yet.  Driving  If you become dizzy or disoriented while driving, you could hurt yourself and others. That's why it's best to not drive until symptoms have gone away. In some cases, your license may be temporarily held until it's safe for you to drive again.  For safety:  · Ask a friend to drive for you.  · Take public transportation.  · Walk to stores and other places when you can.  Asking for help  Don't be afraid to ask for help running errands, cooking meals, and doing exercise.  Whether it's a friend, loved one, neighbor, or stranger on the street, a little help can make a world of difference.   Date Last Reviewed: 11/1/2016  © 0042-3267 [employer]. [address] [place] [address], [place] [address]. All rights reserved. This information is not intended as a substitute for professional medical care. Always follow your healthcare professional's instructions.

## 2020-02-15 NOTE — ED PROVIDER NOTES
Encounter Date: 2/15/2020       History     Chief Complaint   Patient presents with    Dizziness     Reports recent diagnosis of vertigo x1 week and concerned that she bumped head on cabinet prior to symptoms beginning, wondering if needs a CT of heads.     59 y.o. female Past Medical History:  No date: Anxiety  No date: Chronic back pain  No date: Vertigo     Endorses vertigo x 1 week, all started when she hit her head on a cabinet. States she saw an MD who told her that if she did not improve she would need a head ct. The patient notes that she has had intermittent bouts of vertigo. Also noted to have vomiting yesterday. Called urgent care who told her to go to ED for CT scan        Review of patient's allergies indicates:   Allergen Reactions    Morphine     Reglan [metoclopramide hcl]     Sulfa (sulfonamide antibiotics)     Iodine and iodide containing products Rash     Past Medical History:   Diagnosis Date    Anxiety     Chronic back pain     Vertigo      Past Surgical History:   Procedure Laterality Date    ELBOW SURGERY      HYSTERECTOMY      KNEE SURGERY       Family History   Problem Relation Age of Onset    No Known Problems Mother     No Known Problems Father      Social History     Tobacco Use    Smoking status: Never Smoker    Smokeless tobacco: Never Used   Substance Use Topics    Alcohol use: No     Frequency: Never    Drug use: No     Review of Systems   Constitutional: Negative for fever.   HENT: Negative for sore throat.    Respiratory: Negative for shortness of breath.    Cardiovascular: Negative for chest pain.   Gastrointestinal: Positive for nausea and vomiting.   Genitourinary: Negative for dysuria.   Musculoskeletal: Negative for back pain.   Skin: Negative for rash.   Neurological: Negative for weakness.   Hematological: Does not bruise/bleed easily.   All other systems reviewed and are negative.      Physical Exam     Initial Vitals [02/15/20 1309]   BP Pulse Resp Temp  SpO2   128/80 107 18 98.3 °F (36.8 °C) 97 %      MAP       --         Physical Exam    Nursing note and vitals reviewed.  Constitutional: She appears well-developed and well-nourished.   HENT:   Head: Normocephalic and atraumatic.   Eyes: Conjunctivae and EOM are normal. Pupils are equal, round, and reactive to light.   Neck: Normal range of motion.   Cardiovascular: Normal rate.   Pulmonary/Chest: No respiratory distress.   Abdominal: She exhibits no distension.   Musculoskeletal: Normal range of motion.   Neurological: She is alert. No cranial nerve deficit. GCS score is 15. GCS eye subscore is 4. GCS verbal subscore is 5. GCS motor subscore is 6.   Skin: Skin is warm and dry.   Psychiatric: She has a normal mood and affect. Thought content normal.     no midline ttp on any spinal body on neck/back    ED Course   Procedures  Labs Reviewed - No data to display       Imaging Results    None          Medical Decision Making:   Initial Assessment:   Given mechanism coupled with persistent vertigo/vomiting/headache, have ordered ct head. Clinically suspect post concussive syndrome.                                CT Head Without Contrast   Final Result      No acute intracranial abnormality.         Electronically signed by: Ayaz Glaser MD   Date:    02/15/2020   Time:    14:17          Labs Reviewed - No data to display      Clinical Impression:       ICD-10-CM ICD-9-CM   1. Post concussive syndrome F07.81 310.2                             Varinder Meza MD  02/15/20 1425

## 2020-02-15 NOTE — DISCHARGE INSTRUCTIONS
Thank you for coming to our Emergency Department today. It is important to remember that some problems are difficult to diagnose and may not be found during your first visit. Be sure to follow up with your primary care doctor and review any labs/imaging that was performed with them. If you do not have a primary care doctor, you may contact the one listed on your discharge paperwork or you may also call the [facility] Appointment Desk at [phone] to schedule an appointment with one.     All medications may potentially have side effects and it is impossible to predict which medications may give you side effects. If you feel that you are having a negative effect of any medication you should immediately stop taking them and seek medical attention.    Return to the ER with any questions/concerns, new/concerning symptoms, worsening or failure to improve. Do not drive or make any important decisions for 24 hours if you have received any pain medications, sedatives or mood altering drugs during your ER visit.

## 2020-07-21 ENCOUNTER — HOSPITAL ENCOUNTER (EMERGENCY)
Facility: HOSPITAL | Age: 60
Discharge: HOME | End: 2020-07-21
Attending: EMERGENCY MEDICINE
Payer: COMMERCIAL

## 2020-07-21 VITALS
HEART RATE: 89 BPM | RESPIRATION RATE: 18 BRPM | DIASTOLIC BLOOD PRESSURE: 84 MMHG | SYSTOLIC BLOOD PRESSURE: 146 MMHG | OXYGEN SATURATION: 100 % | WEIGHT: 142 LBS | TEMPERATURE: 98 F | BODY MASS INDEX: 26.83 KG/M2

## 2020-07-21 DIAGNOSIS — M54.2 NECK PAIN: ICD-10-CM
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
## 2020-07-21 DIAGNOSIS — M79.642 LEFT HAND PAIN: ICD-10-CM

## 2020-07-21 DIAGNOSIS — M25.562 ACUTE PAIN OF LEFT KNEE: ICD-10-CM

## 2020-07-21 PROCEDURE — 29125 APPL SHORT ARM SPLINT STATIC: CPT | Mod: LT,ER

## 2020-07-21 PROCEDURE — 25000003 PHARM REV CODE 250: Mod: ER | Performed by: NURSE PRACTITIONER

## 2020-07-21 PROCEDURE — 99284 EMERGENCY DEPT VISIT MOD MDM: CPT | Mod: 25,ER

## 2020-07-21 RX ORDER — IBUPROFEN 600 MG/1
600 TABLET ORAL EVERY 6 HOURS PRN
Qty: 20 TABLET | Refills: 0 | Status: SHIPPED | OUTPATIENT
Start: 2020-07-21

## 2020-07-21 RX ORDER — IBUPROFEN 600 MG/1
600 TABLET ORAL
Status: COMPLETED | OUTPATIENT
Start: 2020-07-21 | End: 2020-07-21

## 2020-07-21 RX ADMIN — IBUPROFEN 600 MG: 600 TABLET ORAL at 06:07

## 2020-07-21 NOTE — ED PROVIDER NOTES
"Encounter Date: 7/21/2020    SCRIBE #1 NOTE: I, Alona Canalestess, am scribing for, and in the presence of,  Dejah Plummer NP. I have scribed the following portions of the note - Other sections scribed: HPI, ROS, PE.       History     Chief Complaint   Patient presents with    Fall     Pt states," I had a fall today. I hit my right side of my face. I have pains in my left arm and my left knee."     Andreina Brannon is a 60 y.o. female who presents to the ED for evaluation of a trip and fall 1 hour PTA. Patient reports she was at home when she bent over to get chips, tripped over lawn chairs, and fell into the door frame. She has left wrist pain, neck pain, and right knee pain and swelling. Denies loss of consciousness, visual disturbances, headache, blurry vision, nausea, vomiting, or diarrhea.    The history is provided by the patient. No  was used.     Review of patient's allergies indicates:   Allergen Reactions    Morphine     Reglan [metoclopramide hcl]     Sulfa (sulfonamide antibiotics)     Iodine and iodide containing products Rash     Past Medical History:   Diagnosis Date    Anxiety     Chronic back pain     Vertigo      Past Surgical History:   Procedure Laterality Date    ELBOW SURGERY      HYSTERECTOMY      KNEE SURGERY       Family History   Problem Relation Age of Onset    No Known Problems Mother     No Known Problems Father      Social History     Tobacco Use    Smoking status: Never Smoker    Smokeless tobacco: Never Used   Substance Use Topics    Alcohol use: No     Frequency: Never    Drug use: No     Review of Systems   Constitutional: Negative for chills and fever.   Eyes: Negative for visual disturbance.   Respiratory: Negative for shortness of breath.    Gastrointestinal: Negative for diarrhea, nausea and vomiting.   Musculoskeletal: Positive for arthralgias (left wrist pain), joint swelling (Left knee pain ) and neck pain.   Neurological: Negative for syncope " and headaches.   All other systems reviewed and are negative.      Physical Exam     Initial Vitals [07/21/20 1720]   BP Pulse Resp Temp SpO2   (!) 146/90 100 16 98 °F (36.7 °C) 97 %      MAP       --         Physical Exam    Nursing note and vitals reviewed.  Constitutional: She appears well-developed.   HENT:   Head: Normocephalic.   Right Ear: External ear normal.   Left Ear: External ear normal.   Nose: Nose normal.   Mouth/Throat: Oropharynx is clear and moist.   Eyes: Conjunctivae and EOM are normal. Pupils are equal, round, and reactive to light.   Neck: Normal range of motion. Neck supple.   Cardiovascular: Normal rate, regular rhythm, S1 normal, S2 normal and normal heart sounds. Exam reveals no gallop and no friction rub.    No murmur heard.  Pulmonary/Chest: Breath sounds normal. No respiratory distress. She has no wheezes. She has no rhonchi. She has no rales.   Abdominal: Soft. There is no abdominal tenderness.   Musculoskeletal: Normal range of motion.      Left elbow: Normal.      Left wrist: She exhibits tenderness.      Left knee: She exhibits normal range of motion. Tenderness found.      Cervical back: Normal. She exhibits normal range of motion and no tenderness.      Comments: Patient is ambulatory without assistance or antalgic gait.  C-spine cleared per protocol.  No midline tenderness of the cervical, thoracic, lumbar spine. 5/5 strength bilateral upper and lower extremities with sensation intact.   Neurological: She is alert and oriented to person, place, and time. She has normal strength. No cranial nerve deficit or sensory deficit. She displays a negative Romberg sign. GCS eye subscore is 4. GCS verbal subscore is 5. GCS motor subscore is 6.   Normal finger-nose.  Normal rapid alternating movements.   Skin: Skin is warm and dry. Capillary refill takes less than 2 seconds.   Psychiatric: She has a normal mood and affect. Her behavior is normal.         ED Course   Splint  Application    Date/Time: 7/21/2020 7:26 PM  Performed by: Dejah Plummer NP  Authorized by: Casey Pierce MD   Location details: left arm  Splint type: thumb spica  Post-procedure: The splinted body part was neurovascularly unchanged following the procedure.  Patient tolerance: Patient tolerated the procedure well with no immediate complications        Labs Reviewed - No data to display       Imaging Results          X-Ray Wrist Complete Left (Final result)  Result time 07/21/20 18:38:47    Final result by Margarita Galeano MD (07/21/20 18:38:47)                 Impression:      As above described.      Electronically signed by: Margarita Galeano  Date:    07/21/2020  Time:    18:38             Narrative:    EXAMINATION:  THREE VIEWS OF THE LEFT WRIST    CLINICAL HISTORY:  Unspecified fall, initial encounter    TECHNIQUE:  AP, oblique, and lateral views of the left wrist    COMPARISON:  None.    FINDINGS:  Three views of the left wrist demonstrate degenerative change at the base of the 1st metacarpal.  The AP view is suboptimal.  Consider repeating.                               X-Ray Knee 3 View Left (Final result)  Result time 07/21/20 18:40:48    Final result by Margarita Galeano MD (07/21/20 18:40:48)                 Impression:      As above described.      Electronically signed by: Margarita Galeano  Date:    07/21/2020  Time:    18:40             Narrative:    EXAMINATION:  THREE VIEWS OF THE LEFT KNEE    CLINICAL HISTORY:  Unspecified fall, initial encounter    TECHNIQUE:  AP, sunrise, and lateral view of the left knee    COMPARISON:  None.    FINDINGS:  The sunrise view is suboptimal.  A hemiarthroplasty is present.  Three views of the left knee demonstrate no definite acute fracture or dislocation.                               X-Ray Cervical Spine AP And Lateral (Final result)  Result time 07/21/20 18:45:26    Final result by Margarita Galeano MD (07/21/20 18:45:26)                 Impression:       No definite evidence of acute fracture.  Spondylitic change.      Electronically signed by: Margarita Galeano  Date:    07/21/2020  Time:    18:45             Narrative:    EXAMINATION:  CERVICAL SPINE    CLINICAL HISTORY:  Pain.    TECHNIQUE:  AP and lateral views of the cervical spine submitted    COMPARISON:  None.    FINDINGS:  There is straightening of the normal cervical lordosis, which may be due to muscular spasm.  There is grade 1 anterolisthesis of C4 on C5 and retrolisthesis of C5 on C6.  This may be due to degenerative change.  Intervertebral disc space narrowing, endplate sclerosis, and marginal osteophytes are seen from C5 through C7..  There is no acute fracture detected.  There is no definite evidence of prevertebral soft tissue swelling. The predental space is maintained.                                 Medical Decision Making:   History:   Old Medical Records: I decided to obtain old medical records.  Independently Interpreted Test(s):   I have ordered and independently interpreted X-rays - see prior notes.  Clinical Tests:   Radiological Study: Ordered and Reviewed  ED Management:  60-year-old female presenting to the ED after fall.  She is complaining of neck pain, left arm pain, left knee pain.  On exam, she is well appearing.  Head is atraumatic and normocephalic.  Normal neuro exam.  C-spine cleared per protocol.  X-ray of cervical spine without acute process.  X-ray of the left knee with no acute fracture dislocation.  X-ray left wrist degenerative changes.  No obvious fracture dislocation.  She has no snuffbox tenderness.  Doubt scaphoid fracture.  Will place patient in a thumb spica splint.  Encourage rice therapy.  Follow up with PCP.            Scribe Attestation:   Scribe #1: I performed the above scribed service and the documentation accurately describes the services I performed. I attest to the accuracy of the note.     Scribe attestation: MATTHIAS SALINAS, personally performed the  services described in this documentation. All medical record entries made by the scribe were at my direction and in my presence.  I have reviewed the chart and agree that the record reflects my personal performance and is accurate and complete.                      Clinical Impression:     1. Fall    2. Left hand pain    3. Acute pain of left knee    4. Neck pain            Disposition:   Disposition: Discharged  Condition: Stable     ED Disposition Condition    Discharge Stable        ED Prescriptions     Medication Sig Dispense Start Date End Date Auth. Provider    ibuprofen (ADVIL,MOTRIN) 600 MG tablet Take 1 tablet (600 mg total) by mouth every 6 (six) hours as needed for Pain. 20 tablet 7/21/2020  Dejah Plummer NP        Follow-up Information     Follow up With Specialties Details Why Contact Info    Benigno Hardwick MD Family Medicine Schedule an appointment as soon as possible for a visit  For follow-up [address]  [facility] [address]  [phone]      [facility] Emergency Department Emergency Medicine Go to  If symptoms worsen [address] [address] [phone]

## 2020-07-22 NOTE — DISCHARGE INSTRUCTIONS

## 2021-04-15 ENCOUNTER — PATIENT MESSAGE (OUTPATIENT)
Dept: RESEARCH | Facility: HOSPITAL | Age: 61
End: 2021-04-15

## 2025-06-20 ENCOUNTER — HOSPITAL ENCOUNTER (EMERGENCY)
Facility: HOSPITAL | Age: 65
Discharge: HOME | End: 2025-06-21
Attending: INTERNAL MEDICINE
Payer: COMMERCIAL

## 2025-06-20 DIAGNOSIS — K52.9 GASTROENTERITIS: ICD-10-CM

## 2025-06-20 DIAGNOSIS — R10.13 EPIGASTRIC DISCOMFORT: ICD-10-CM

## 2025-06-20 DIAGNOSIS — K21.9 GASTROESOPHAGEAL REFLUX DISEASE, UNSPECIFIED WHETHER ESOPHAGITIS PRESENT: Primary | ICD-10-CM

## 2025-06-20 LAB
ALBUMIN SERPL-MCNC: 3.8 G/DL (ref 3.3–5.5)
ALBUMIN SERPL-MCNC: 4.2 G/DL (ref 3.3–5.5)
ALP SERPL-CCNC: 47 U/L (ref 42–141)
ALP SERPL-CCNC: 53 U/L (ref 42–141)
BILIRUB SERPL-MCNC: 1.3 MG/DL (ref 0.2–1.6)
BILIRUB SERPL-MCNC: 1.3 MG/DL (ref 0.2–1.6)
BILIRUBIN, POC UA: NEGATIVE
BLOOD, POC UA: NEGATIVE
BUN SERPL-MCNC: 13 MG/DL (ref 7–22)
CALCIUM SERPL-MCNC: 9.9 MG/DL (ref 8–10.3)
CHLORIDE SERPL-SCNC: 100 MMOL/L (ref 98–108)
CLARITY, UA: CLEAR
COLOR, UA: YELLOW
CREAT SERPL-MCNC: 0.6 MG/DL (ref 0.6–1.2)
GLUCOSE SERPL-MCNC: 120 MG/DL (ref 73–118)
GLUCOSE, POC UA: NEGATIVE
HCT, POC: NORMAL
HGB, POC: NORMAL (ref 14–18)
KETONES, POC UA: NEGATIVE
LEUKOCYTE EST, POC UA: NEGATIVE
MCH, POC: NORMAL
MCHC, POC: NORMAL
MCV, POC: NORMAL
MPV, POC: NORMAL
NITRITE, POC UA: NEGATIVE
PH UR STRIP: 6 [PH] (ref 5–8)
POC ALT (SGPT): 14 U/L (ref 10–47)
POC ALT (SGPT): 5 U/L (ref 10–47)
POC AMYLASE: 28 U/L (ref 14–97)
POC AST (SGOT): 18 U/L (ref 11–38)
POC AST (SGOT): 18 U/L (ref 11–38)
POC GGT: 12 U/L (ref 5–65)
POC PLATELET COUNT: NORMAL
POC TCO2: 29 MMOL/L (ref 18–33)
POTASSIUM BLD-SCNC: 3.5 MMOL/L (ref 3.6–5.1)
PROTEIN, POC UA: NEGATIVE
PROTEIN, POC: 6.9 G/DL (ref 6.4–8.1)
PROTEIN, POC: 7.2 G/DL (ref 6.4–8.1)
RBC, POC: NORMAL
RDW, POC: NORMAL
SODIUM BLD-SCNC: 138 MMOL/L (ref 128–145)
SPECIFIC GRAVITY, POC UA: 1.02 (ref 1–1.03)
UROBILINOGEN, POC UA: 0.2 E.U./DL
WBC, POC: NORMAL

## 2025-06-20 PROCEDURE — 93010 ELECTROCARDIOGRAM REPORT: CPT | Mod: ,,, | Performed by: INTERNAL MEDICINE

## 2025-06-20 PROCEDURE — 82040 ASSAY OF SERUM ALBUMIN: CPT | Mod: 59,ER

## 2025-06-20 PROCEDURE — 96361 HYDRATE IV INFUSION ADD-ON: CPT | Mod: ER

## 2025-06-20 PROCEDURE — 93005 ELECTROCARDIOGRAM TRACING: CPT | Mod: ER

## 2025-06-20 PROCEDURE — 63600175 PHARM REV CODE 636 W HCPCS: Mod: ER | Performed by: INTERNAL MEDICINE

## 2025-06-20 PROCEDURE — 96375 TX/PRO/DX INJ NEW DRUG ADDON: CPT | Mod: ER

## 2025-06-20 PROCEDURE — 80053 COMPREHEN METABOLIC PANEL: CPT | Mod: ER

## 2025-06-20 PROCEDURE — 25000003 PHARM REV CODE 250: Mod: ER | Performed by: INTERNAL MEDICINE

## 2025-06-20 PROCEDURE — 85025 COMPLETE CBC W/AUTO DIFF WBC: CPT | Mod: ER

## 2025-06-20 PROCEDURE — 82150 ASSAY OF AMYLASE: CPT | Mod: ER

## 2025-06-20 PROCEDURE — 96374 THER/PROPH/DIAG INJ IV PUSH: CPT | Mod: ER

## 2025-06-20 PROCEDURE — 99285 EMERGENCY DEPT VISIT HI MDM: CPT | Mod: 25,ER

## 2025-06-20 RX ORDER — ACETAMINOPHEN 500 MG
1000 TABLET ORAL
Status: COMPLETED | OUTPATIENT
Start: 2025-06-20 | End: 2025-06-20

## 2025-06-20 RX ORDER — ONDANSETRON HYDROCHLORIDE 2 MG/ML
4 INJECTION, SOLUTION INTRAVENOUS
Status: COMPLETED | OUTPATIENT
Start: 2025-06-20 | End: 2025-06-20

## 2025-06-20 RX ORDER — FAMOTIDINE 10 MG/ML
20 INJECTION, SOLUTION INTRAVENOUS
Status: COMPLETED | OUTPATIENT
Start: 2025-06-20 | End: 2025-06-20

## 2025-06-20 RX ORDER — ARMODAFINIL 200 MG/1
200 TABLET ORAL
COMMUNITY

## 2025-06-20 RX ORDER — KETOROLAC TROMETHAMINE 30 MG/ML
30 INJECTION, SOLUTION INTRAMUSCULAR; INTRAVENOUS
Status: COMPLETED | OUTPATIENT
Start: 2025-06-20 | End: 2025-06-20

## 2025-06-20 RX ORDER — ALUMINUM HYDROXIDE, MAGNESIUM HYDROXIDE, AND SIMETHICONE 1200; 120; 1200 MG/30ML; MG/30ML; MG/30ML
30 SUSPENSION ORAL ONCE
Status: COMPLETED | OUTPATIENT
Start: 2025-06-20 | End: 2025-06-20

## 2025-06-20 RX ORDER — LIDOCAINE HYDROCHLORIDE 20 MG/ML
15 SOLUTION OROPHARYNGEAL ONCE
Status: COMPLETED | OUTPATIENT
Start: 2025-06-20 | End: 2025-06-20

## 2025-06-20 RX ORDER — DOXYCYCLINE HYCLATE 100 MG/1
100 TABLET, DELAYED RELEASE ORAL 2 TIMES DAILY
COMMUNITY

## 2025-06-20 RX ADMIN — ONDANSETRON 4 MG: 2 INJECTION INTRAMUSCULAR; INTRAVENOUS at 10:06

## 2025-06-20 RX ADMIN — SODIUM CHLORIDE 1000 ML: 9 INJECTION, SOLUTION INTRAVENOUS at 10:06

## 2025-06-20 RX ADMIN — KETOROLAC TROMETHAMINE 30 MG: 30 INJECTION, SOLUTION INTRAMUSCULAR; INTRAVENOUS at 11:06

## 2025-06-20 RX ADMIN — FAMOTIDINE 20 MG: 10 INJECTION, SOLUTION INTRAVENOUS at 10:06

## 2025-06-20 RX ADMIN — ALUMINUM HYDROXIDE, MAGNESIUM HYDROXIDE, AND SIMETHICONE 30 ML: 200; 200; 20 SUSPENSION ORAL at 10:06

## 2025-06-20 RX ADMIN — POTASSIUM BICARBONATE 40 MEQ: 391 TABLET, EFFERVESCENT ORAL at 10:06

## 2025-06-20 RX ADMIN — ACETAMINOPHEN 1000 MG: 500 TABLET ORAL at 11:06

## 2025-06-20 RX ADMIN — LIDOCAINE HYDROCHLORIDE 15 ML: 20 SOLUTION ORAL at 10:06

## 2025-06-21 VITALS
BODY MASS INDEX: 26.81 KG/M2 | WEIGHT: 142 LBS | DIASTOLIC BLOOD PRESSURE: 65 MMHG | TEMPERATURE: 100 F | SYSTOLIC BLOOD PRESSURE: 138 MMHG | HEART RATE: 108 BPM | HEIGHT: 61 IN | RESPIRATION RATE: 20 BRPM | OXYGEN SATURATION: 100 %

## 2025-06-21 RX ORDER — FAMOTIDINE 20 MG/1
20 TABLET, FILM COATED ORAL 2 TIMES DAILY
Qty: 20 TABLET | Refills: 0 | Status: SHIPPED | OUTPATIENT
Start: 2025-06-21 | End: 2026-06-21

## 2025-06-21 RX ORDER — ONDANSETRON 4 MG/1
4 TABLET, ORALLY DISINTEGRATING ORAL EVERY 8 HOURS PRN
Qty: 10 TABLET | Refills: 0 | Status: SHIPPED | OUTPATIENT
Start: 2025-06-21

## 2025-06-21 NOTE — ED PROVIDER NOTES
Encounter Date: 6/20/2025       History     Chief Complaint   Patient presents with    Abdominal Pain     Pt c/o epigastric/R abd pain as well as bilateral low back pain since yesterday with nausea     65-year-old female with a past medical history significant for anxiety presents to emergency department complaining of right upper quadrant/epigastric abdominal pain x2 days.  Patient states pain seems to radiate to her back and was associated with 1 episode of nausea/vomiting earlier today.  Denies fever/chills/chest pain/shortness breath.    The history is provided by the patient. No  was used.     Review of patient's allergies indicates:   Allergen Reactions    Morphine     Reglan [metoclopramide hcl]     Sulfa (sulfonamide antibiotics)     Iodine and iodide containing products Rash     Past Medical History:   Diagnosis Date    Anxiety     Chronic back pain     Vertigo      Past Surgical History:   Procedure Laterality Date    ELBOW SURGERY      HYSTERECTOMY      KNEE SURGERY       Family History   Problem Relation Name Age of Onset    No Known Problems Mother      No Known Problems Father       Social History[1]  Review of Systems   Constitutional:  Negative for chills and fever.   Respiratory:  Negative for shortness of breath.    Cardiovascular:  Negative for chest pain.   Gastrointestinal:  Positive for abdominal pain, nausea and vomiting. Negative for anal bleeding, blood in stool, constipation, diarrhea and rectal pain.   All other systems reviewed and are negative.      Physical Exam     Initial Vitals [06/20/25 2103]   BP Pulse Resp Temp SpO2   130/77 (!) 117 20 98.9 °F (37.2 °C) 99 %      MAP       --         Physical Exam    Nursing note and vitals reviewed.  Constitutional: She is not diaphoretic. No distress.   HENT:   Head: Normocephalic and atraumatic.   Right Ear: External ear normal.   Left Ear: External ear normal.   Eyes: Conjunctivae are normal.   Neck:   Normal range of  motion.  Cardiovascular:  Normal rate and regular rhythm.           Pulmonary/Chest: Breath sounds normal. No respiratory distress.   Abdominal: Abdomen is soft. Bowel sounds are normal. She exhibits no distension and no mass. There is abdominal tenderness (Epigastric and right upper quadrant tenderness to palpation). There is no rebound and no guarding.   Musculoskeletal:         General: Normal range of motion.      Cervical back: Normal range of motion.     Neurological: She is alert. She has normal strength.   Skin: Skin is warm and dry.   Psychiatric: She has a normal mood and affect.         ED Course   Procedures  Labs Reviewed   POCT CMP - Abnormal       Result Value    Albumin, POC 3.8      Alkaline Phosphatase, POC 47      ALT (SGPT), POC 5 (*)     AST (SGOT), POC 18      POC BUN 13      Calcium, POC 9.9      POC Chloride 100      POC Creatinine 0.6      POC Glucose 120 (*)     POC Potassium 3.5 (*)     POC Sodium 138      Bilirubin, POC 1.3      POC TCO2 29      Protein, POC 6.9     POCT CBC    Hematocrit        Hemoglobin        RBC        WBC        MCV        MCH, POC        MCHC        RDW-CV        Platelet Count, POC        MPV       POCT URINALYSIS W/O SCOPE   POCT CMP   POCT LIVER PANEL   POCT LIVER PANEL    Albumin, POC 4.2      Alkaline Phosphatase, POC 53      ALT (SGPT), POC 14      Amylase, POC 28      AST (SGOT), POC 18      POC GGT 12      Bilirubin, POC 1.3      Protein, POC 7.2     POCT URINALYSIS W/O SCOPE    Glucose, UA Negative      Bilirubin, UA Negative      Ketones, UA Negative      Spec Grav UA 1.020      Blood, UA Negative      PH, UA 6.0      Protein, UA Negative      Urobilinogen, UA 0.2      Nitrite, UA Negative      Leukocytes, UA Negative      Color, UA POC Yellow      Clarity, UA, POC Clear            Imaging Results              US Abdomen Limited (Final result)  Result time 06/20/25 23:11:20      Final result by Pasquale Beal MD (06/20/25 23:11:20)                    Impression:    IMPRESSION:  Unremarkable right upper quadrant ultrasound.    -Electronically Signed By: Pasquale Beal MD   -Electronically Signed On:  6/20/2025 11:11 PM      Report Ends               Narrative:    EXAM: US ABDOMEN LIMITED    HISTORY: Right upper quadrant abdominal pain.     TECHNIQUE: Grayscale sonographic evaluation of the right upper quadrant supplemented with color and spectral Doppler as needed.    COMPARISON: None.    FINDINGS:    Liver: Unremarkable.    Gallbladder: No shadowing stones. No wall thickening. No pericholecystic fluid.    Common bile duct: Normal caliber.    Pancreas: Limited visualization due to bowel gas. Visualized portions are grossly unremarkable.    Right kidney: No hydronephrosis.    Spleen: Normal                                       Medications   aluminum-magnesium hydroxide-simethicone 200-200-20 mg/5 mL suspension 30 mL (30 mLs Oral Given 6/20/25 2206)     And   LIDOcaine viscous HCl 2% oral solution 15 mL (15 mLs Oral Given 6/20/25 2206)   sodium chloride 0.9% bolus 1,000 mL 1,000 mL (0 mLs Intravenous Stopped 6/20/25 2326)   famotidine (PF) injection 20 mg (20 mg Intravenous Given 6/20/25 2213)   ondansetron injection 4 mg (4 mg Intravenous Given 6/20/25 2213)   potassium bicarbonate disintegrating tablet 40 mEq (40 mEq Oral Given 6/20/25 2254)   acetaminophen tablet 1,000 mg (1,000 mg Oral Given 6/20/25 2326)   ketorolac injection 30 mg (30 mg Intravenous Given 6/20/25 2326)     Medical Decision Making  65-year-old female with a past medical history significant for anxiety presents to emergency department complaining of right upper quadrant/epigastric abdominal pain x2 days.  Patient states pain seems to radiate to her back and was associated with 1 episode of nausea/vomiting earlier today.  Denies fever/chills/chest pain/shortness breath.  Course of ED stay:   CBC shows slightly elevated white blood cell count (11,000).  CMP is reassuring except for mild  hypokalemia.  Potassium was replaced in the ED. serum amylase and transaminases were normal.  Ultrasound of abdomen showed no acute abnormalities.  Patient received normal saline bolus, Pepcid, Zofran, GI cocktail.  She states symptoms resolved after medications and fluids.  Instructions for gastroenteritis were given and the patient was advised to follow up with her primary care physician within the next week for re-evaluation/return to the emergency department if condition worsens.  Prescriptions for Pepcid/Zofran were given prior to discharge.    Amount and/or Complexity of Data Reviewed  Labs: ordered.  Radiology: ordered.    Risk  OTC drugs.  Prescription drug management.                                      Clinical Impression:  Final diagnoses:  [R10.13] Epigastric discomfort  [K21.9] Gastroesophageal reflux disease, unspecified whether esophagitis present (Primary)  [K52.9] Gastroenteritis          ED Disposition Condition    Discharge Stable          ED Prescriptions       Medication Sig Dispense Start Date End Date Auth. Provider    ondansetron (ZOFRAN-ODT) 4 MG TbDL Take 1 tablet (4 mg total) by mouth every 8 (eight) hours as needed (Nausea). 10 tablet 6/21/2025 -- Regan Dickson MD    famotidine (PEPCID) 20 MG tablet Take 1 tablet (20 mg total) by mouth 2 (two) times daily. 20 tablet 6/21/2025 6/21/2026 Regan Dickson MD          Follow-up Information    None                [1]   Social History  Tobacco Use    Smoking status: Never    Smokeless tobacco: Never   Substance Use Topics    Alcohol use: No    Drug use: No        Regan Dickson MD  06/21/25 0145

## 2025-06-22 LAB
OHS QRS DURATION: 84 MS
OHS QTC CALCULATION: 416 MS